# Patient Record
(demographics unavailable — no encounter records)

---

## 2018-10-07 NOTE — EDM.PDOC
ED HPI GENERAL MEDICAL PROBLEM





- General


Chief Complaint: Skin Complaint


Stated Complaint: HIVES


Time Seen by Provider: 10/07/18 12:20


Source of Information: Reports: Patient


History Limitations: Reports: No Limitations





- History of Present Illness


INITIAL COMMENTS - FREE TEXT/NARRATIVE: 





HISTORY AND PHYSICAL:





History of present illness:


Patient is a 20-year-old female here with complaint of hives. She states she 

has had them for 2 months. She was seen at [facility] one month ago and 

given and shot and steroids. She states this helped for a short time but hives 

came back. She states benadryl is no longer helping as much as it use to. She 

currently has hives on her buttock and legs, reports it is very itchy. She is 

uncertain what is causing them, denies any new detergents, soaps, food, 

medications, etc. She denies any SOB, oral swelling, fevers, chills, nausea, 

vomiting, diarrhea, abdominal pain. 





Review of systems: 


As per history of present illness and below otherwise all systems reviewed and 

negative.





Past medical history: 


As per history of present illness and as reviewed below otherwise 

noncontributory.





Surgical history: 


As per history of present illness and as reviewed below otherwise 

noncontributory.





Social history: 


No reported history of drug or alcohol abuse.





Family history: 


As per history of present illness and as reviewed below otherwise 

noncontributory.





Physical exam:


General: Patient sitting comfortably in no acute distress and nontoxic appearing


HEENT: Atraumatic, normocephalic, pupils reactive, negative for conjunctival 

pallor or scleral icterus, mucous membranes moist, throat clear, neck supple, 

nontender, trachea midline. No meningeal signs. 


Lungs: Clear to auscultation, breath sounds equal bilaterally, chest nontender.


Heart: S1S2, regular, negative for clicks, rubs, or overt murmur.


Skin: erythematous raised wheal on the right leg just below the knee. Small 

wheals noted on the buttocks with secondary excoriations. 


Extremities: Atraumatic, negative for cords or calf pain. Neurovascular 

unremarkable.


Neuro: Awake, alert, oriented. Cranial nerves II through XII unremarkable. 

Cerebellum unremarkable. Motor and sensory unremarkable throughout. Exam 

nonfocal.





Notes: Discussed with patient to follow up with primary care provider for 

further testing and possible referral to Allergy specialist. 





Diagnostics:


None





Therapeutics:


Solumedrol 125mg IM 





Prescriptions:


Medrol dosepak





Impression: 


Urticaria 





Plan:


1. Take medications as prescribed. Take benadryl as needed, may try benadryl 

cream as well.


2. Follow up with primary care provider as instructed


3. Return to ED as needed as discussed 





Definitive disposition and diagnosis as appropriate pending reevaluation and 

review of above.








- Related Data


 Allergies











Allergy/AdvReac Type Severity Reaction Status Date / Time


 


No Known Allergies Allergy   Verified 10/07/18 12:09











Home Meds: 


 Home Meds





methylPREDNISolone [Medrol] 4 mg PO ASDIRECTED #1 tab.ds.pk 10/07/18 [Rx]











Past Medical History


Musculoskeletal History: Reports: Fracture, Other (See Below)


Other Musculoskeletal History: right arm fracture


Neurological History: Reports: Other (See Below)


Psychiatric History: Reports: Depression





- Infectious Disease History


Infectious Disease History: Reports: Influenza





- Past Surgical History


HEENT Surgical History: Reports: Myringotomy w Tube(s)





Social & Family History





- Family History


Family Medical History: Noncontributory





- Tobacco Use


Smoking Status *Q: Current Every Day Smoker


Years of Tobacco use: 3


Packs/Tins Daily: 0.5


Second Hand Smoke Exposure: Yes





- Caffeine Use


Caffeine Use: Reports: Coffee





- Recreational Drug Use


Recreational Drug Use: No





ED ROS GENERAL





- Review of Systems


Review Of Systems: ROS reveals no pertinent complaints other than HPI.





ED EXAM, SKIN/RASH


Exam: See Below (see dictation)





Course





- Vital Signs


Last Recorded V/S: 


 Last Vital Signs











Temp  36.3 C   10/07/18 12:09


 


Pulse  126 H  10/07/18 12:09


 


Resp  18   10/07/18 12:09


 


BP  128/87   10/07/18 12:09


 


Pulse Ox  98   10/07/18 12:09














- Orders/Labs/Meds


Meds: 


Medications














Discontinued Medications














Generic Name Dose Route Start Last Admin





  Trade Name Freq  PRN Reason Stop Dose Admin


 


Methylprednisolone Sodium Succinate  125 mg  10/07/18 12:25  





  Solu-Medrol  IVPUSH  10/07/18 12:26  





  ONETIME ONE   





     





     





     





     














Departure





- Departure


Time of Disposition: 12:37


Disposition: Home, Self-Care 01


Condition: Good


Clinical Impression: 


 Urticaria








- Discharge Information


Prescriptions: 


methylPREDNISolone [Medrol] 4 mg PO ASDIRECTED #1 tab.ds.pk


Referrals: 


PCP,None [Primary Care Provider] - 


Forms:  ED Department Discharge


Additional Instructions: 


The following information is given to patients seen in the emergency department 

who are being discharged to home. This information is to outline your options 

for follow-up care. We provide all patients seen in our emergency department 

with a follow-up referral.





The need for follow-up, as well as the timing and circumstances, are variable 

depending upon the specifics of your emergency department visit.





If you don't have a primary care physician on staff, we will provide you with a 

referral. We always advise you to contact your personal physician following an 

emergency department visit to inform them of the circumstance of the visit and 

for follow-up with them and/or the need for any referrals to a consulting 

specialist.





The emergency department will also refer you to a specialist when appropriate. 

This referral assures that you have the opportunity for follow-up care with a 

specialist. All of these measure are taken in an effort to provide you with 

optimal care, which includes your follow-up.





Under all circumstances we always encourage you to contact your private 

physician who remains a resource for coordinating your care. When calling for 

follow-up care, please make the office aware that this follow-up is from your 

recent emergency room visit. If for any reason you are refused follow-up, 

please contact the [facility] Emergency 

Department at [phone] and asked to speak to the emergency department 

charge nurse.





[facility]


Primary Care


[address] [place], [place] [id number]


Phone: [phone]


Fax: [phone]





[place] [address]


Phone: [phone]


Fax: [phone]





1. Take medications as prescribed. Take benadryl as needed, may try benadryl 

cream as well.


2. Follow up with primary care provider as instructed


3. Return to ED as needed as discussed